# Patient Record
Sex: FEMALE | Race: BLACK OR AFRICAN AMERICAN | NOT HISPANIC OR LATINO
[De-identification: names, ages, dates, MRNs, and addresses within clinical notes are randomized per-mention and may not be internally consistent; named-entity substitution may affect disease eponyms.]

---

## 2017-04-06 ENCOUNTER — RESULT REVIEW (OUTPATIENT)
Age: 57
End: 2017-04-06

## 2019-05-24 ENCOUNTER — RESULT REVIEW (OUTPATIENT)
Age: 59
End: 2019-05-24

## 2021-01-21 ENCOUNTER — APPOINTMENT (OUTPATIENT)
Dept: NEUROLOGY | Facility: CLINIC | Age: 61
End: 2021-01-21
Payer: COMMERCIAL

## 2021-01-21 VITALS
OXYGEN SATURATION: 98 % | BODY MASS INDEX: 31.65 KG/M2 | WEIGHT: 190 LBS | HEIGHT: 65 IN | HEART RATE: 70 BPM | DIASTOLIC BLOOD PRESSURE: 60 MMHG | SYSTOLIC BLOOD PRESSURE: 141 MMHG | TEMPERATURE: 98.5 F

## 2021-01-21 DIAGNOSIS — Z78.9 OTHER SPECIFIED HEALTH STATUS: ICD-10-CM

## 2021-01-21 DIAGNOSIS — Z80.9 FAMILY HISTORY OF MALIGNANT NEOPLASM, UNSPECIFIED: ICD-10-CM

## 2021-01-21 PROCEDURE — 99205 OFFICE O/P NEW HI 60 MIN: CPT

## 2021-01-21 PROCEDURE — 99072 ADDL SUPL MATRL&STAF TM PHE: CPT

## 2021-01-21 RX ORDER — CYCLOBENZAPRINE HYDROCHLORIDE 5 MG/1
5 TABLET, FILM COATED ORAL 3 TIMES DAILY
Qty: 15 | Refills: 0 | Status: ACTIVE | COMMUNITY
Start: 2021-01-21 | End: 1900-01-01

## 2021-01-21 RX ORDER — IBUPROFEN 200 MG/1
200 TABLET, COATED ORAL
Refills: 0 | Status: ACTIVE | COMMUNITY
Start: 2021-01-21

## 2021-01-21 RX ORDER — GABAPENTIN 300 MG/1
300 CAPSULE ORAL
Qty: 90 | Refills: 5 | Status: ACTIVE | COMMUNITY
Start: 2021-01-21 | End: 1900-01-01

## 2021-01-21 NOTE — HISTORY OF PRESENT ILLNESS
[FreeTextEntry1] : The patient is here for neck pain in the left side started 2020.  The patient did PT for 2 months and took Advil without relief of the pain.  She also has the neck pain that is described as stiffness and shoots down her left arm as tingling sensation.  No weakness.  No known recent trauma.\par \par She had a fall in 2015 and had CTH which was abnormal at that time, she has not followed up.

## 2021-01-21 NOTE — CONSULT LETTER
[Dear  ___] : Dear  [unfilled], [Consult Letter:] : I had the pleasure of evaluating your patient, [unfilled]. [Please see my note below.] : Please see my note below. [Consult Closing:] : Thank you very much for allowing me to participate in the care of this patient.  If you have any questions, please do not hesitate to contact me. [Sincerely,] : Sincerely, [FreeTextEntry3] : Dinorah Hooker MD, MPH\par

## 2021-01-21 NOTE — ASSESSMENT
[FreeTextEntry1] : The patient has neck pain with cervical tension and tingling symptoms in the left arm concerning for cervical radiculopathy.  WIll get MRI C spine and ncs.emg, will start Neurontin and titrate to 300mg tid.\par \par The patient also has left sided cervical muscle spasm, will given Flexeril 5mg tid x 5 days, patient also advised to try a massage and warm compresses on that area.\par \par Prior CTH imaging showing 6 mm lucent structure in the right inferior basal ganglia, may represent a prominent perivascular space or an old lacunar infarct.  WIll get MRI brain to better evaluate the lesion and will direct care accordingly.

## 2021-01-21 NOTE — PHYSICAL EXAM
[General Appearance - Alert] : alert [General Appearance - In No Acute Distress] : in no acute distress [Person] : oriented to person [Time] : oriented to time [Place] : oriented to place [Registration Intact] : recent registration memory intact [Concentration Intact] : normal concentrating ability [Visual Intact] : visual attention was ~T not ~L decreased [Naming Objects] : no difficulty naming common objects [Repeating Phrases] : no difficulty repeating a phrase [Fluency] : fluency intact [Comprehension] : comprehension intact [Vocabulary] : adequate range of vocabulary [Cranial Nerves Optic (II)] : visual acuity intact bilaterally,  visual fields full to confrontation, pupils equal round and reactive to light [Cranial Nerves Oculomotor (III)] : extraocular motion intact [Cranial Nerves Trigeminal (V)] : facial sensation intact symmetrically [Cranial Nerves Facial (VII)] : face symmetrical [Cranial Nerves Glossopharyngeal (IX)] : tongue and palate midline [Cranial Nerves Vestibulocochlear (VIII)] : hearing was intact bilaterally [Cranial Nerves Accessory (XI - Cranial And Spinal)] : head turning and shoulder shrug symmetric [Cranial Nerves Hypoglossal (XII)] : there was no tongue deviation with protrusion [Motor Tone] : muscle tone was normal in all four extremities [Motor Strength] : muscle strength was normal in all four extremities [Sensation Tactile Decrease] : light touch was intact [Involuntary Movements] : no involuntary movements were seen [Abnormal Walk] : normal gait [Balance] : balance was intact [1+] : Ankle jerk left 1+ [Full Pulse] : the pedal pulses are present [Coordination - Dysmetria Impaired Finger-to-Nose Bilateral] : not present [Coordination - Dysmetria Impaired Heel-to-Shin Bilateral] : not present [Plantar Reflex Right Only] : normal on the right [Plantar Reflex Left Only] : normal on the left [FreeTextEntry5] : fundi not visualized [FreeTextEntry9] : Fara emanuel [FreeTextEntry1] : cervcal muscle spasm on the left

## 2021-01-21 NOTE — DATA REVIEWED
[de-identified] :  note appreciated\par path noted \par \par \par  EXAM: BRAIN CT WITHOUT CONTRAST \par PROCEDURE DATE: 08/19/2015 \par INTERPRETATION: Head CT without IV contrast 8/19/2015 at 0919 hours \par Clinical Indication: Head trauma. \par Technique: Axial CT images of the head are obtained from the skull base to \par the vertex without IV contrast. \par Comparison: None. \par Findings: There is no acute intracranial hemorrhage. No CT evidence for an \par acute territorial infarct. There is a 6 mm lucent structure in the right \par inferior basal ganglia. There is no midline shift, or herniation. The \par ventricles maintain normal size, shape, and location. The sulci are \par symmetric and normal for age bilaterally. No extra-axial fluid collection. \par The gray-white differentiation is preserved. The visualized paranasal \par sinuses and orbits appear grossly unremarkable. No evidence for a skull \par fracture. \par IMPRESSION: No acute intracranial hemorrhage. No CT evidence for an acute \par territorial infarct. \par No evidence for a skull fracture. \par 6 mm lucent structure in the right inferior basal ganglia. This may \par represent a prominent perivascular space or an old lacunar infarct. \par CHRISSIE FINNEY M.D., ATTENDING RADIOLOGIST \par This document has been electronically signed. Aug 19 2015 9:41AM

## 2021-03-19 ENCOUNTER — APPOINTMENT (OUTPATIENT)
Dept: NEUROLOGY | Facility: CLINIC | Age: 61
End: 2021-03-19
Payer: COMMERCIAL

## 2021-03-19 VITALS
WEIGHT: 190 LBS | DIASTOLIC BLOOD PRESSURE: 69 MMHG | OXYGEN SATURATION: 95 % | HEIGHT: 65 IN | TEMPERATURE: 97.3 F | BODY MASS INDEX: 31.65 KG/M2 | HEART RATE: 70 BPM | SYSTOLIC BLOOD PRESSURE: 123 MMHG

## 2021-03-19 PROCEDURE — 95912 NRV CNDJ TEST 11-12 STUDIES: CPT

## 2021-03-19 PROCEDURE — 99072 ADDL SUPL MATRL&STAF TM PHE: CPT

## 2021-03-19 PROCEDURE — 95886 MUSC TEST DONE W/N TEST COMP: CPT

## 2021-05-14 ENCOUNTER — APPOINTMENT (OUTPATIENT)
Dept: NEUROLOGY | Facility: CLINIC | Age: 61
End: 2021-05-14
Payer: COMMERCIAL

## 2021-05-14 VITALS
TEMPERATURE: 97.9 F | DIASTOLIC BLOOD PRESSURE: 78 MMHG | HEART RATE: 71 BPM | HEIGHT: 65 IN | WEIGHT: 190 LBS | OXYGEN SATURATION: 99 % | SYSTOLIC BLOOD PRESSURE: 141 MMHG | BODY MASS INDEX: 31.65 KG/M2

## 2021-05-14 DIAGNOSIS — M54.12 RADICULOPATHY, CERVICAL REGION: ICD-10-CM

## 2021-05-14 DIAGNOSIS — R90.89 OTHER ABNORMAL FINDINGS ON DIAGNOSTIC IMAGING OF CENTRAL NERVOUS SYSTEM: ICD-10-CM

## 2021-05-14 PROCEDURE — 99072 ADDL SUPL MATRL&STAF TM PHE: CPT

## 2021-05-14 PROCEDURE — 99214 OFFICE O/P EST MOD 30 MIN: CPT

## 2021-05-14 NOTE — DATA REVIEWED
[de-identified] : unremarkable [de-identified] : ncs/emg arms shows chronic left C7 radiculopathy\par MRI C spine shows annular buldge/ posterior hypertrophy left?right C6-7, multilevel degenerative hypertrophy C2-3 through C5-6 with resultant left NF stenosis and minimal budlges C3-4, C4-5 and C5-6

## 2021-05-14 NOTE — ASSESSMENT
[FreeTextEntry1] : The patient has neck pain with cervical tension and tingling symptoms in the left arm cw chronic left C7 radiculopathy on ncs/emg and MRI C spine shows annular bulge / posterior hypertrophy left?right C6-7, multilevel degenerative hypertrophy C2-3 through C5-6 with resultant left NF stenosis and minimal bulges C3-4, C4-5 and C5-6.  Will refer to PT.  Patient declines neuropathic medications for the pain.\par \par Prior CTH imaging showing 6 mm lucent structure in the right inferior basal ganglia, may represent a prominent perivascular space or an old lacunar infarct but MRI brain was read by radiology as unremarkable\par

## 2021-05-14 NOTE — HISTORY OF PRESENT ILLNESS
[FreeTextEntry1] : The patient is here for neck pain in the left side started 2020. The patient did PT for 2 months and took Advil without relief of the pain. She also has the neck pain that is described as stiffness and shoots down her left arm as tingling sensation. No weakness. No known recent trauma.\par \par She had a fall in 2015 and had CTH which was abnormal at that time. had MRi brain since last visit.\par The patient was given Neurontin 300mg tid with improvement of symptoms.

## 2021-05-14 NOTE — PHYSICAL EXAM
[General Appearance - Alert] : alert [General Appearance - In No Acute Distress] : in no acute distress [Person] : oriented to person [Place] : oriented to place [Time] : oriented to time [Concentration Intact] : normal concentrating ability [Naming Objects] : no difficulty naming common objects [Fluency] : fluency intact [Comprehension] : comprehension intact [Vocabulary] : adequate range of vocabulary [Motor Tone] : muscle tone was normal in all four extremities [Motor Strength] : muscle strength was normal in all four extremities [Sensation Tactile Decrease] : light touch was intact [Abnormal Walk] : normal gait [Balance] : balance was intact

## 2022-02-08 ENCOUNTER — APPOINTMENT (OUTPATIENT)
Dept: NEUROLOGY | Facility: CLINIC | Age: 62
End: 2022-02-08